# Patient Record
Sex: MALE | Race: WHITE | NOT HISPANIC OR LATINO | Employment: FULL TIME | URBAN - METROPOLITAN AREA
[De-identification: names, ages, dates, MRNs, and addresses within clinical notes are randomized per-mention and may not be internally consistent; named-entity substitution may affect disease eponyms.]

---

## 2021-02-08 ENCOUNTER — HOSPITAL ENCOUNTER (OUTPATIENT)
Facility: HOSPITAL | Age: 56
Discharge: HOME | End: 2021-02-09
Attending: EMERGENCY MEDICINE | Admitting: INTERNAL MEDICINE

## 2021-02-08 DIAGNOSIS — K62.5 RECTAL BLEEDING: Primary | ICD-10-CM

## 2021-02-08 DIAGNOSIS — R55 SYNCOPE, UNSPECIFIED SYNCOPE TYPE: ICD-10-CM

## 2021-02-08 PROBLEM — F10.90 HEAVY ALCOHOL CONSUMPTION: Status: ACTIVE | Noted: 2021-02-08

## 2021-02-08 PROBLEM — I10 ESSENTIAL HYPERTENSION: Status: ACTIVE | Noted: 2021-02-08

## 2021-02-08 PROBLEM — E03.9 HYPOTHYROIDISM (ACQUIRED): Status: ACTIVE | Noted: 2021-02-08

## 2021-02-08 LAB
ABO GROUP BLD: NORMAL
ABO GROUP BLD: NORMAL
ALBUMIN SERPL-MCNC: 3.8 G/DL (ref 3.5–5.2)
ALBUMIN/GLOB SERPL: 1.4 G/DL
ALP SERPL-CCNC: 66 U/L (ref 39–117)
ALT SERPL W P-5'-P-CCNC: 18 U/L (ref 1–41)
ANION GAP SERPL CALCULATED.3IONS-SCNC: 8 MMOL/L (ref 5–15)
AST SERPL-CCNC: 22 U/L (ref 1–40)
BASOPHILS # BLD AUTO: 0.03 10*3/MM3 (ref 0–0.2)
BASOPHILS NFR BLD AUTO: 0.5 % (ref 0–1.5)
BILIRUB SERPL-MCNC: 0.2 MG/DL (ref 0–1.2)
BLD GP AB SCN SERPL QL: NEGATIVE
BUN SERPL-MCNC: 17 MG/DL (ref 6–20)
BUN/CREAT SERPL: 16.2 (ref 7–25)
CALCIUM SPEC-SCNC: 8.8 MG/DL (ref 8.6–10.5)
CHLORIDE SERPL-SCNC: 112 MMOL/L (ref 98–107)
CO2 SERPL-SCNC: 22 MMOL/L (ref 22–29)
CREAT SERPL-MCNC: 1.05 MG/DL (ref 0.76–1.27)
D-LACTATE SERPL-SCNC: 1.1 MMOL/L (ref 0.5–2)
DEPRECATED RDW RBC AUTO: 43.3 FL (ref 37–54)
DEVELOPER EXPIRATION DATE: ABNORMAL
DEVELOPER LOT NUMBER: ABNORMAL
EOSINOPHIL # BLD AUTO: 0.14 10*3/MM3 (ref 0–0.4)
EOSINOPHIL NFR BLD AUTO: 2.4 % (ref 0.3–6.2)
ERYTHROCYTE [DISTWIDTH] IN BLOOD BY AUTOMATED COUNT: 12.8 % (ref 12.3–15.4)
ETHANOL BLD-MCNC: <10 MG/DL (ref 0–10)
EXPIRATION DATE: 923
FECAL OCCULT BLOOD SCREEN, POC: POSITIVE
FLUAV RNA RESP QL NAA+PROBE: NOT DETECTED
FLUBV RNA RESP QL NAA+PROBE: NOT DETECTED
GFR SERPL CREATININE-BSD FRML MDRD: 73 ML/MIN/1.73
GLOBULIN UR ELPH-MCNC: 2.8 GM/DL
GLUCOSE SERPL-MCNC: 111 MG/DL (ref 65–99)
HCT VFR BLD AUTO: 42.6 % (ref 37.5–51)
HCT VFR BLD AUTO: 43.7 % (ref 37.5–51)
HCT VFR BLD AUTO: 47.4 % (ref 37.5–51)
HGB BLD-MCNC: 13.5 G/DL (ref 13–17.7)
HGB BLD-MCNC: 14.2 G/DL (ref 13–17.7)
HGB BLD-MCNC: 15.9 G/DL (ref 13–17.7)
IMM GRANULOCYTES # BLD AUTO: 0.01 10*3/MM3 (ref 0–0.05)
IMM GRANULOCYTES NFR BLD AUTO: 0.2 % (ref 0–0.5)
INR PPP: 1.05 (ref 0.85–1.16)
LYMPHOCYTES # BLD AUTO: 1.81 10*3/MM3 (ref 0.7–3.1)
LYMPHOCYTES NFR BLD AUTO: 30.5 % (ref 19.6–45.3)
Lab: ABNORMAL
MCH RBC QN AUTO: 31 PG (ref 26.6–33)
MCHC RBC AUTO-ENTMCNC: 33.5 G/DL (ref 31.5–35.7)
MCV RBC AUTO: 92.4 FL (ref 79–97)
MONOCYTES # BLD AUTO: 0.6 10*3/MM3 (ref 0.1–0.9)
MONOCYTES NFR BLD AUTO: 10.1 % (ref 5–12)
NEGATIVE CONTROL: NEGATIVE
NEUTROPHILS NFR BLD AUTO: 3.34 10*3/MM3 (ref 1.7–7)
NEUTROPHILS NFR BLD AUTO: 56.3 % (ref 42.7–76)
NRBC BLD AUTO-RTO: 0 /100 WBC (ref 0–0.2)
PLATELET # BLD AUTO: 227 10*3/MM3 (ref 140–450)
PMV BLD AUTO: 9.7 FL (ref 6–12)
POSITIVE CONTROL: POSITIVE
POTASSIUM SERPL-SCNC: 4.6 MMOL/L (ref 3.5–5.2)
PROT SERPL-MCNC: 6.6 G/DL (ref 6–8.5)
PROTHROMBIN TIME: 13.4 SECONDS (ref 11.5–14)
RBC # BLD AUTO: 5.13 10*6/MM3 (ref 4.14–5.8)
RH BLD: POSITIVE
RH BLD: POSITIVE
SARS-COV-2 RNA RESP QL NAA+PROBE: NOT DETECTED
SODIUM SERPL-SCNC: 142 MMOL/L (ref 136–145)
T&S EXPIRATION DATE: NORMAL
WBC # BLD AUTO: 5.93 10*3/MM3 (ref 3.4–10.8)

## 2021-02-08 PROCEDURE — G0378 HOSPITAL OBSERVATION PER HR: HCPCS

## 2021-02-08 PROCEDURE — 82077 ASSAY SPEC XCP UR&BREATH IA: CPT | Performed by: PHYSICIAN ASSISTANT

## 2021-02-08 PROCEDURE — 86901 BLOOD TYPING SEROLOGIC RH(D): CPT

## 2021-02-08 PROCEDURE — 85610 PROTHROMBIN TIME: CPT | Performed by: EMERGENCY MEDICINE

## 2021-02-08 PROCEDURE — 82270 OCCULT BLOOD FECES: CPT | Performed by: EMERGENCY MEDICINE

## 2021-02-08 PROCEDURE — 25010000002 THIAMINE PER 100 MG: Performed by: PHYSICIAN ASSISTANT

## 2021-02-08 PROCEDURE — 99285 EMERGENCY DEPT VISIT HI MDM: CPT

## 2021-02-08 PROCEDURE — 99220 PR INITIAL OBSERVATION CARE/DAY 70 MINUTES: CPT | Performed by: PHYSICIAN ASSISTANT

## 2021-02-08 PROCEDURE — 86901 BLOOD TYPING SEROLOGIC RH(D): CPT | Performed by: EMERGENCY MEDICINE

## 2021-02-08 PROCEDURE — C9803 HOPD COVID-19 SPEC COLLECT: HCPCS

## 2021-02-08 PROCEDURE — 86900 BLOOD TYPING SEROLOGIC ABO: CPT

## 2021-02-08 PROCEDURE — 85014 HEMATOCRIT: CPT | Performed by: HOSPITALIST

## 2021-02-08 PROCEDURE — 93005 ELECTROCARDIOGRAM TRACING: CPT | Performed by: EMERGENCY MEDICINE

## 2021-02-08 PROCEDURE — 83605 ASSAY OF LACTIC ACID: CPT | Performed by: PHYSICIAN ASSISTANT

## 2021-02-08 PROCEDURE — 86850 RBC ANTIBODY SCREEN: CPT | Performed by: EMERGENCY MEDICINE

## 2021-02-08 PROCEDURE — 86900 BLOOD TYPING SEROLOGIC ABO: CPT | Performed by: EMERGENCY MEDICINE

## 2021-02-08 PROCEDURE — 85025 COMPLETE CBC W/AUTO DIFF WBC: CPT | Performed by: EMERGENCY MEDICINE

## 2021-02-08 PROCEDURE — 85018 HEMOGLOBIN: CPT | Performed by: HOSPITALIST

## 2021-02-08 PROCEDURE — 87636 SARSCOV2 & INF A&B AMP PRB: CPT | Performed by: EMERGENCY MEDICINE

## 2021-02-08 PROCEDURE — 80053 COMPREHEN METABOLIC PANEL: CPT | Performed by: EMERGENCY MEDICINE

## 2021-02-08 PROCEDURE — 99203 OFFICE O/P NEW LOW 30 MIN: CPT | Performed by: PHYSICIAN ASSISTANT

## 2021-02-08 RX ORDER — SODIUM CHLORIDE 0.9 % (FLUSH) 0.9 %
10 SYRINGE (ML) INJECTION EVERY 12 HOURS SCHEDULED
Status: DISCONTINUED | OUTPATIENT
Start: 2021-02-08 | End: 2021-02-09 | Stop reason: HOSPADM

## 2021-02-08 RX ORDER — SODIUM CHLORIDE 0.9 % (FLUSH) 0.9 %
10 SYRINGE (ML) INJECTION AS NEEDED
Status: DISCONTINUED | OUTPATIENT
Start: 2021-02-08 | End: 2021-02-09 | Stop reason: HOSPADM

## 2021-02-08 RX ORDER — LEVOTHYROXINE SODIUM 0.03 MG/1
25 TABLET ORAL DAILY
COMMUNITY

## 2021-02-08 RX ORDER — FLUTICASONE PROPIONATE 50 MCG
2 SPRAY, SUSPENSION (ML) NASAL DAILY
COMMUNITY

## 2021-02-08 RX ORDER — LEVOTHYROXINE SODIUM 0.03 MG/1
25 TABLET ORAL
Status: DISCONTINUED | OUTPATIENT
Start: 2021-02-08 | End: 2021-02-09 | Stop reason: HOSPADM

## 2021-02-08 RX ORDER — CHOLECALCIFEROL (VITAMIN D3) 125 MCG
5 CAPSULE ORAL NIGHTLY PRN
Status: DISCONTINUED | OUTPATIENT
Start: 2021-02-08 | End: 2021-02-09 | Stop reason: HOSPADM

## 2021-02-08 RX ORDER — ACETAMINOPHEN 325 MG/1
650 TABLET ORAL EVERY 4 HOURS PRN
Status: DISCONTINUED | OUTPATIENT
Start: 2021-02-08 | End: 2021-02-09 | Stop reason: HOSPADM

## 2021-02-08 RX ORDER — ONDANSETRON 2 MG/ML
4 INJECTION INTRAMUSCULAR; INTRAVENOUS EVERY 6 HOURS PRN
Status: DISCONTINUED | OUTPATIENT
Start: 2021-02-08 | End: 2021-02-09 | Stop reason: HOSPADM

## 2021-02-08 RX ORDER — SODIUM CHLORIDE 9 MG/ML
100 INJECTION, SOLUTION INTRAVENOUS CONTINUOUS
Status: ACTIVE | OUTPATIENT
Start: 2021-02-08 | End: 2021-02-09

## 2021-02-08 RX ADMIN — SODIUM CHLORIDE 1000 ML: 9 INJECTION, SOLUTION INTRAVENOUS at 03:07

## 2021-02-08 RX ADMIN — THIAMINE HYDROCHLORIDE 100 ML/HR: 100 INJECTION, SOLUTION INTRAMUSCULAR; INTRAVENOUS at 08:51

## 2021-02-08 RX ADMIN — SODIUM CHLORIDE 100 ML/HR: 9 INJECTION, SOLUTION INTRAVENOUS at 18:05

## 2021-02-08 RX ADMIN — LEVOTHYROXINE SODIUM 25 MCG: 25 TABLET ORAL at 10:02

## 2021-02-08 NOTE — ED PROVIDER NOTES
Subjective   55-year-old male presents for evaluation of rectal bleeding.  Of note, the patient states that he had a colonoscopy 3 weeks ago that showed some polyps and diverticulosis.  Tonight, he felt the urge to go to the bathroom and got out of bed.  He had a large bowel movement and went back to bed.  He then felt the urge to have another bowel movement and after defecating turn the lights on and noted a significant amount of blood in the toilet bowl.  After third bloody bowel movement with more bright red blood, he came to the emergency department to be evaluated.  He states that he had a fourth bloody bowel movement in the waiting room.  He denies any accompanying abdominal pain.  No rectal pain.  He states that prior to his first bowel movement tonight, he had a syncopal episode at home.  No LOC.  No family history of sudden cardiac death.  He is not anticoagulated.  He denies any issues with rectal bleeding before in the past.          Review of Systems   Gastrointestinal: Positive for blood in stool.   Neurological: Positive for syncope.   All other systems reviewed and are negative.      No past medical history on file.    No Known Allergies    No past surgical history on file.    No family history on file.    Social History     Socioeconomic History   • Marital status:      Spouse name: Not on file   • Number of children: Not on file   • Years of education: Not on file   • Highest education level: Not on file           Objective   Physical Exam  Vitals signs and nursing note reviewed.   Constitutional:       General: He is not in acute distress.     Appearance: Normal appearance. He is well-developed. He is not diaphoretic.      Comments: Nontoxic-appearing male   HENT:      Head: Normocephalic and atraumatic.   Neck:      Musculoskeletal: No muscular tenderness.      Vascular: No JVD.   Cardiovascular:      Rate and Rhythm: Normal rate and regular rhythm.      Heart sounds: Normal heart sounds.  "No murmur. No friction rub. No gallop.    Pulmonary:      Effort: Pulmonary effort is normal. No respiratory distress.      Breath sounds: Normal breath sounds. No wheezing or rales.   Abdominal:      General: Bowel sounds are normal. There is no distension.      Palpations: Abdomen is soft. There is no mass.      Tenderness: There is no abdominal tenderness. There is no guarding.      Comments: No focal abdominal tenderness, no peritoneal signs, no pain out of proportion to exam   Genitourinary:     Comments: Bright red blood noted per rectum, dried blood noted per rectum, no visible or palpable hemorrhoids  Musculoskeletal: Normal range of motion.   Skin:     General: Skin is warm and dry.      Coloration: Skin is not pale.      Findings: No erythema or rash.   Neurological:      General: No focal deficit present.      Mental Status: He is alert and oriented to person, place, and time.      Comments: Normal gait   Psychiatric:         Mood and Affect: Mood normal.         Thought Content: Thought content normal.         Judgment: Judgment normal.         Procedures           ED Course  ED Course as of Feb 08 0638   Mon Feb 08, 2021 0315 55-year-old male presents for evaluation of painless rectal bleeding.  Patient states that he has had for bloody bowel movements tonight with bright red blood.  Each subsequent bowel movement seems to be more more bloody.  He also endorses a syncopal episode.  Of note, the patient states that he had a colonoscopy 3 weeks ago that revealed some \"polyps\" as well as diverticulosis.  On arrival to the ED, the patient is nontoxic-appearing.  He is tachycardic.  On rectal exam, the patient has visible bright red blood and dried blood but no rectal tenderness or hemorrhoids noted.  Nonsurgical abdomen.  Labs remarkable for hemoglobin of 15.    [DD]   0354 Given the patient's overall clinical picture, multiple bloody bowel movements, and syncopal episode, feel that he warrants admission " to the hospital for observation and gastroenterology consult this morning.  He is in agreement.  I discussed his case with Dr. Grey, the patient will be admitted under her care for further evaluation and treatment.  He is aware/agreeable with the plan at this time.    [DD]      ED Course User Index  [DD] Marv Contreras MD                                   Recent Results (from the past 24 hour(s))   Comprehensive Metabolic Panel    Collection Time: 02/08/21  2:44 AM    Specimen: Blood   Result Value Ref Range    Glucose 111 (H) 65 - 99 mg/dL    BUN 17 6 - 20 mg/dL    Creatinine 1.05 0.76 - 1.27 mg/dL    Sodium 142 136 - 145 mmol/L    Potassium 4.6 3.5 - 5.2 mmol/L    Chloride 112 (H) 98 - 107 mmol/L    CO2 22.0 22.0 - 29.0 mmol/L    Calcium 8.8 8.6 - 10.5 mg/dL    Total Protein 6.6 6.0 - 8.5 g/dL    Albumin 3.80 3.50 - 5.20 g/dL    ALT (SGPT) 18 1 - 41 U/L    AST (SGOT) 22 1 - 40 U/L    Alkaline Phosphatase 66 39 - 117 U/L    Total Bilirubin 0.2 0.0 - 1.2 mg/dL    eGFR Non African Amer 73 >60 mL/min/1.73    Globulin 2.8 gm/dL    A/G Ratio 1.4 g/dL    BUN/Creatinine Ratio 16.2 7.0 - 25.0    Anion Gap 8.0 5.0 - 15.0 mmol/L   Protime-INR    Collection Time: 02/08/21  2:44 AM    Specimen: Blood   Result Value Ref Range    Protime 13.4 11.5 - 14.0 Seconds    INR 1.05 0.85 - 1.16   CBC Auto Differential    Collection Time: 02/08/21  2:44 AM    Specimen: Blood   Result Value Ref Range    WBC 5.93 3.40 - 10.80 10*3/mm3    RBC 5.13 4.14 - 5.80 10*6/mm3    Hemoglobin 15.9 13.0 - 17.7 g/dL    Hematocrit 47.4 37.5 - 51.0 %    MCV 92.4 79.0 - 97.0 fL    MCH 31.0 26.6 - 33.0 pg    MCHC 33.5 31.5 - 35.7 g/dL    RDW 12.8 12.3 - 15.4 %    RDW-SD 43.3 37.0 - 54.0 fl    MPV 9.7 6.0 - 12.0 fL    Platelets 227 140 - 450 10*3/mm3    Neutrophil % 56.3 42.7 - 76.0 %    Lymphocyte % 30.5 19.6 - 45.3 %    Monocyte % 10.1 5.0 - 12.0 %    Eosinophil % 2.4 0.3 - 6.2 %    Basophil % 0.5 0.0 - 1.5 %    Immature Grans % 0.2 0.0 - 0.5 %     Neutrophils, Absolute 3.34 1.70 - 7.00 10*3/mm3    Lymphocytes, Absolute 1.81 0.70 - 3.10 10*3/mm3    Monocytes, Absolute 0.60 0.10 - 0.90 10*3/mm3    Eosinophils, Absolute 0.14 0.00 - 0.40 10*3/mm3    Basophils, Absolute 0.03 0.00 - 0.20 10*3/mm3    Immature Grans, Absolute 0.01 0.00 - 0.05 10*3/mm3    nRBC 0.0 0.0 - 0.2 /100 WBC   Ethanol    Collection Time: 02/08/21  2:44 AM    Specimen: Blood   Result Value Ref Range    Ethanol <10 0 - 10 mg/dL   Type & Screen    Collection Time: 02/08/21  2:48 AM    Specimen: Blood   Result Value Ref Range    ABO Type A     RH type Positive     Antibody Screen Negative     T&S Expiration Date 2/11/2021 11:59:59 PM    COVID-19 and FLU A/B PCR - Swab, Nasopharynx    Collection Time: 02/08/21  3:08 AM    Specimen: Nasopharynx; Swab   Result Value Ref Range    COVID19 Not Detected Not Detected - Ref. Range    Influenza A PCR Not Detected Not Detected    Influenza B PCR Not Detected Not Detected   POC Occult Blood Stool    Collection Time: 02/08/21  3:19 AM    Specimen: Per Rectum; Stool   Result Value Ref Range    Fecal Occult Blood Positive (A) Negative    Lot Number [id number]     Expiration Date 923     DEVELOPER LOT NUMBER [id number]     DEVELOPER EXPIRATION DATE 202,210     Positive Control Positive Positive    Negative Control Negative Negative   Lactic Acid, Plasma    Collection Time: 02/08/21  4:13 AM    Specimen: Blood   Result Value Ref Range    Lactate 1.1 0.5 - 2.0 mmol/L     Note: In addition to lab results from this visit, the labs listed above may include labs taken at another facility or during a different encounter within the last 24 hours. Please correlate lab times with ED admission and discharge times for further clarification of the services performed during this visit.    No orders to display     Vitals:    02/08/21 0300 02/08/21 0330 02/08/21 0400 02/08/21 0603   BP: 141/84 127/84 128/89 115/81   BP Location:    Left arm   Patient Position:    Standing   Pulse:  89  85 96   Resp:  18 18 18   Temp:       TempSrc:       SpO2: 94% 98% 96% 96%   Weight:       Height:         Medications   sodium chloride 0.9 % flush 10 mL (has no administration in time range)   thiamine (B-1) 100 mg, folic acid 1 mg in sodium chloride 0.9 % 1,000 mL infusion (has no administration in time range)   sodium chloride 0.9 % bolus 1,000 mL (0 mL Intravenous Stopped 2/8/21 0520)     ECG/EMG Results (last 24 hours)     Procedure Component Value Units Date/Time    ECG 12 Lead [[id number]] Collected: 02/08/21 0405     Updated: 02/08/21 0405        ECG 12 Lead                     MDM    Final diagnoses:   Rectal bleeding   Syncope, unspecified syncope type            Marv Contreras MD  02/08/21 0621

## 2021-02-08 NOTE — H&P
[facility] Medicine Services  Clinical Decision Unit (CDU)  History and Physical    Patient Name: Rufino Ellis  : 1965  MRN: [id number]  Primary Care Physician: Rena Oh MD  Date of admission: 2021  2:45 AM      Subjective   Subjective     Chief Complaint:  Rectal bleeding    HPI:  Rufino Ellis is a 55 y.o. male with a past medical history significant for hypertension and hypothyroidism. He presents today with complaints of acute onset rectal bleeding early this morning around 0100. Patient states he got up in the middle of the night with mild abdominal cramping/bloating. States he got an intense sudden urge to have a bowel movement. Went to the bathroom and passed a large volume of loose stool. Patient got out of bed a second time for what he thought was diarrhea. After the second bowel movement, patient had a near syncopal episode after getting off the commode. States he caught himself before he fell. He then turned on the lights to examine the stool and noticed the commode was full of blood. He passed another large volume of blood before coming to ED for further evaluation. States he passed another large volume of blood in the waiting room after which he was weak and light headed. Currently there are no complaints of fever, cough, congestion, SOB, or chest pain. No abdominal pain, nausea or vomiting. He denies NSAID use, ASA, or anticoagulation. He is not a smoker but does admit to drinking 3 bourbons daily every weekend from Friday thru . States he does not drink during the week and denies history of dependence, withdrawal, or DT's. Of note, patient did have a colonoscopy 3 weeks ago with Dr. Pérez at [facility]. At this time diverticulosis was noted and 3 polyps were removed. Pathology was negative for malignancy per patient. Will admit to observation.      Review of Systems   Constitutional: Negative for chills and fever.   HENT: Negative for congestion  and trouble swallowing.    Eyes: Negative for photophobia and visual disturbance.   Respiratory: Negative for cough and shortness of breath.    Cardiovascular: Negative for chest pain and leg swelling.   Gastrointestinal: Positive for anal bleeding, blood in stool and diarrhea. Negative for nausea and vomiting.   Endocrine: Negative for cold intolerance and heat intolerance.   Genitourinary: Negative for dysuria and flank pain.   Musculoskeletal: Negative for back pain and gait problem.   Skin: Negative for pallor and rash.   Allergic/Immunologic: Negative for immunocompromised state.   Neurological: Positive for dizziness, weakness and light-headedness.   Hematological: Negative for adenopathy.   Psychiatric/Behavioral: Negative for agitation and confusion.        All other systems reviewed and negative    Personal History     Past Medical History:   Diagnosis Date   • Hypertension        Past Surgical History:   Procedure Laterality Date   • COLONOSCOPY     • NOSE SURGERY     • SKIN BIOPSY     • TIBIA FRACTURE SURGERY         Family History: family history is not on file. Otherwise pertinent FHx was reviewed and unremarkable.     Social History:  reports that he has never smoked. He has never used smokeless tobacco. He reports current alcohol use. He reports that he does not use drugs.  Social History     Social History Narrative   • Not on file       Medications:  fluticasone and levothyroxine    No Known Allergies    Objective   Objective     Vital Signs:   Temp:  [96.8 °F (36 °C)] 96.8 °F (36 °C)  Heart Rate:  [] 89  Resp:  [16-18] 18  BP: (127-149)/(84-88) 127/84    Physical Exam   Constitutional: Awake, alert  Eyes: PERRLA, sclerae anicteric, no conjunctival injection  HENT: NCAT, mucous membranes moist  Neck: Supple, no thyromegaly, no lymphadenopathy, trachea midline  Respiratory: Clear to auscultation bilaterally, nonlabored respirations   Cardiovascular: RRR, no murmurs, rubs, or gallops, palpable  pedal pulses bilaterally  Gastrointestinal: Positive bowel sounds, soft, nontender, nondistended  Musculoskeletal: No bilateral ankle edema, no clubbing or cyanosis to extremities  Psychiatric: Appropriate affect, cooperative  Neurologic: Oriented x 3, strength symmetric in all extremities, Cranial Nerves grossly intact to confrontation, speech clear  Skin: No rashes      Results Reviewed:  Vitals stable. Chemistry and hematology favorable. Heme occult positive. EKG without acute changes. COVID PCR negative.    Assessment/Plan   Assessment / Plan     Active Hospital Problems    Diagnosis POA   • **Rectal bleeding [K62.5] Yes     Priority: High   • Essential hypertension [I10] Yes     Priority: Medium   • Heavy alcohol consumption [Z78.9] Yes     Priority: Low   • Hypothyroidism (acquired) [E03.9] Yes     Priority: Low       Plan:  1. Rectal Bleeding:  - lower GI source  - hemodynamically stable. H/H stable  - NPO for now  - consult to GI  - repeat H/H in 6 hours    2. HTN:  - controlled and stable. Not on medications at home  - PRN meds as needed    3. Hypothyroidism:  - continue levothyroxine    4. Heavy alcohol consumption:  - check alcohol level  - banana bag x3  - CIWA assessment. Initiate alcohol protocol as needed    5. Mechanical DVT prophylaxis        CODE STATUS:  Full code  Code Status and Medical Interventions:   Ordered at: 02/08/21 0400     Level Of Support Discussed With:    Patient     Code Status:    CPR     Medical Interventions (Level of Support Prior to Arrest):    Full     Admission Status: OBSERVATION status, however if further evaluation or treatment plans warrant, status may change.  Based upon current information, I predict patient's care encounter to be less than or equal to 2 midnights.        Discharge Blueprint (criteria for discharge):   1. Evaluation of rectal bleeding by GI  2. Resolution of bleeding    Electronically signed by Terry Geronimo PA-C, 02/08/21, 4:05 AM EST.

## 2021-02-08 NOTE — PROGRESS NOTES
Discharge Planning Assessment  [facility]     Patient Name: Rufino Ellis  MRN: [id number]  Today's Date: 2/8/2021    Admit Date: 2/8/2021    Discharge Needs Assessment     Row Name 02/08/21 1001       Living Environment    Lives With  spouse    Name(s) of Who Lives With Patient  brigida ellis - Relation: Spouse - Home: [phone]    Current Living Arrangements  home/apartment/condo    Primary Care Provided by  self    Provides Primary Care For  no one    Family Caregiver if Needed  spouse    Family Caregiver Names  brigida ellis - Relation: Spouse - Home: [phone]    Quality of Family Relationships  helpful;involved;supportive    Able to Return to Prior Arrangements  yes       Resource/Environmental Concerns    Resource/Environmental Concerns  none    Transportation Concerns  car, none       Transition Planning    Patient/Family Anticipates Transition to  home with family    Patient/Family Anticipated Services at Transition  none    Transportation Anticipated  family or friend will provide       Discharge Needs Assessment    Readmission Within the Last 30 Days  no previous admission in last 30 days    Equipment Currently Used at Home  none    Concerns to be Addressed  denies needs/concerns at this time    Anticipated Changes Related to Illness  none    Equipment Needed After Discharge  none        Discharge Plan     Row Name 02/08/21 1002       Plan    Plan  Initial    Plan Comments  CM spoke with patient at bedside. Patient resides in [facility] with his spouse. Patient is independent with ADL's Patient denies any DME. Patient denies any current home health or outpatient services. Patient denies any discharge planning needs. Goal is home with spouse. CM following.    Final Discharge Disposition Code  30 - still a patient        Continued Care and Services - Admitted Since 2/8/2021    Coordination has not been started for this encounter.         Demographic Summary     Row Name 02/08/21 1000       General  Information    Arrived From  home    Referral Source  emergency department    Reason for Consult  discharge planning    Preferred Language  English     Used During This Interaction  no    General Information Comments  PCP: Rena Oh       Contact Information    Contact Information Comments  brigida terrazas - Relation: Spouse - Home: [phone]        Functional Status     Row Name 02/08/21 1001       Functional Status    Usual Activity Tolerance  good    Current Activity Tolerance  good       Functional Status, IADL    Medications  independent    Meal Preparation  independent    Housekeeping  independent    Laundry  independent    Shopping  independent       Mental Status    General Appearance WDL  WDL       Mental Status Summary    Recent Changes in Mental Status/Cognitive Functioning  no changes       Employment/    Employment Status  employed full-time                Samantha Ovalles RN

## 2021-02-08 NOTE — CONSULTS
[facility] Gastroenterology Consult    Referring Provider: Yevgeniy Dockery MD   PCP: Rena Oh MD    Reason for Consultation: Rectal bleeding     Chief complaint: Bloody stools     History of present illness:    Rufino Ellis is a pleasant 55 y.o. male who is admitted with acute rectal bleeding.  He states he was in his normal state of health yesterday and went to bed after watching the super bowl.   He awoke in the night at 0100 with mild abdominal cramping and fecal urgency.  He went to the bathroom and had diarrhea but did not look at the stools.  He subsequently had two more bowel movements that he then noticed were bright red.  He denies any abdominal pain.  He had a screening colonoscopy with Dr. Pérez two weeks ago (1/26/21) with three polyps removed and findings of sigmoid diverticulosis.  Of note, his wife was recently diagnosed last year with Stage IV Colon Cancer.    He does not take any anticoagulation.  He does not regularly use NSAIDs.       Allergies:  Patient has no known allergies.    Scheduled Meds:  levothyroxine, 25 mcg, Oral, Q AM  sodium chloride, 10 mL, Intravenous, Q12H  IV Fluids 1000 mL + additives, 100 mL/hr, Intravenous, Daily       Infusions:  sodium chloride, 100 mL/hr      PRN Meds:  •  acetaminophen  •  melatonin  •  ondansetron  •  [COMPLETED] Insert peripheral IV **AND** sodium chloride  •  sodium chloride    Home Meds:  Medications Prior to Admission   Medication Sig Dispense Refill Last Dose   • fluticasone (FLONASE) 50 MCG/ACT nasal spray 2 sprays into the nostril(s) as directed by provider Daily.      • levothyroxine (SYNTHROID, LEVOTHROID) 25 MCG tablet Take 25 mcg by mouth Daily.          ROS: Review of Systems   Constitutional: Negative.    HENT: Negative.    Eyes: Negative.    Respiratory: Negative.    Cardiovascular: Negative.    Gastrointestinal: Positive for blood in stool. Negative for abdominal pain, nausea and vomiting.   Endocrine: Negative.    Genitourinary: Negative.   "  Musculoskeletal: Negative.    Skin: Negative.    Allergic/Immunologic: Negative.    Neurological: Negative.    Hematological: Negative.    Psychiatric/Behavioral: Negative.        PAST MED HX:  Past Medical History:   Diagnosis Date   • Hypertension        PAST SURG HX:  Past Surgical History:   Procedure Laterality Date   • COLONOSCOPY     • NOSE SURGERY     • SKIN BIOPSY     • TIBIA FRACTURE SURGERY         FAM HX:  History reviewed. No pertinent family history.    SOC HX:  Social History     Socioeconomic History   • Marital status:      Spouse name: Not on file   • Number of children: Not on file   • Years of education: Not on file   • Highest education level: Not on file   Tobacco Use   • Smoking status: Never Smoker   • Smokeless tobacco: Never Used   Substance and Sexual Activity   • Alcohol use: Yes     Comment: pt reports drinks everyweekend , 2-3 drinks bourbon  each day friday, saturday, and sunday    • Drug use: Never   • Sexual activity: Defer       PHYSICAL EXAM  /92 (BP Location: Right arm, Patient Position: Sitting)   Pulse 95   Temp 97.9 °F (36.6 °C) (Oral)   Resp 16   Ht 182.9 cm (72\")   Wt 92.1 kg (203 lb)   SpO2 94%   BMI 27.53 kg/m²   Wt Readings from Last 3 Encounters:   02/08/21 92.1 kg (203 lb)   ,body mass index is 27.53 kg/m².  Physical Exam  Constitutional:       General: He is not in acute distress.  HENT:      Head: Normocephalic and atraumatic.      Mouth/Throat:      Mouth: Mucous membranes are moist.   Eyes:      General: No scleral icterus.  Cardiovascular:      Rate and Rhythm: Normal rate and regular rhythm.   Pulmonary:      Effort: Pulmonary effort is normal.      Breath sounds: Normal breath sounds.   Abdominal:      General: Bowel sounds are normal. There is no distension.      Palpations: Abdomen is soft.      Tenderness: There is no abdominal tenderness.   Musculoskeletal:      Right lower leg: No edema.      Left lower leg: No edema.   Skin:     " General: Skin is warm and dry.   Neurological:      General: No focal deficit present.      Mental Status: He is alert and oriented to person, place, and time.   Psychiatric:         Behavior: Behavior normal.         Thought Content: Thought content normal.       Results Review:   I reviewed the patient's new clinical results.    Lab Results   Component Value Date    WBC 5.93 02/08/2021    HGB 14.2 02/08/2021    HGB 15.9 02/08/2021    HCT 43.7 02/08/2021    MCV 92.4 02/08/2021     02/08/2021       Lab Results   Component Value Date    INR 1.05 02/08/2021       Lab Results   Component Value Date    GLUCOSE 111 (H) 02/08/2021    BUN 17 02/08/2021    CREATININE 1.05 02/08/2021    EGFRIFNONA 73 02/08/2021    BCR 16.2 02/08/2021     02/08/2021    K 4.6 02/08/2021    CO2 22.0 02/08/2021    CALCIUM 8.8 02/08/2021    ALBUMIN 3.80 02/08/2021    ALKPHOS 66 02/08/2021    BILITOT 0.2 02/08/2021    ALT 18 02/08/2021    AST 22 02/08/2021       ASSESSMENTS/PLANS    1. Rectal bleeding  2. History of colon polyps s/p recent colonoscopy with polypectomies on 1/26/21 at [facility]  3. Sigmoid diverticulosis     Pleasant 55 year old male that presents with acute rectal bleeding.   He had a colonoscopy two weeks ago with Dr. Pérez at [facility].  I have reviewed the report that showed five colon polyps removed in descending and transverse colon.  Size ranged from 0.5 to 1.25 cm and removed with hot snare.   Suspect his bleeding is a delayed post polypectomy bleed.   Differential would include diverticular.     His bleeding has stopped since arrival.       >>> Will observe overnight and allow clear liquid diet.  Serial H&H    >>> Defer colonoscopy unless overt bleeding persists.       I discussed the patient's findings and my recommendations with patient    KARLI Mary  02/08/21  12:25 EST

## 2021-02-08 NOTE — PROGRESS NOTES
[facility] Medicine Services  ADMISSION FOLLOW-UP NOTE          Patient admitted after midnight, H&P by my partner performed earlier on today's date reviewed.  Interim findings, labs, and charting also reviewed.        [facility] Problem List has been managed and updated to include any new diagnoses:  Active Hospital Problems    Diagnosis  POA   • **Rectal bleeding [K62.5]  Yes   • Essential hypertension [I10]  Yes   • Heavy alcohol consumption [Z78.9]  Yes   • Hypothyroidism (acquired) [E03.9]  Yes      Resolved Hospital Problems   No resolved problems to display.         ADDITIONAL PLAN:  - detailed assessment and plan from admission reviewed  - Chart reviewed and patient examined.    Rectal bleeding  --type and screen  --serial H&H  --GI consulted for endoscpopy    ETOH abuse  --thiamine    Yevgeniy Dockery MD  02/08/21

## 2021-02-09 ENCOUNTER — ANESTHESIA (OUTPATIENT)
Dept: GASTROENTEROLOGY | Facility: HOSPITAL | Age: 56
End: 2021-02-09

## 2021-02-09 ENCOUNTER — ANESTHESIA EVENT (OUTPATIENT)
Dept: GASTROENTEROLOGY | Facility: HOSPITAL | Age: 56
End: 2021-02-09

## 2021-02-09 VITALS
HEIGHT: 72 IN | TEMPERATURE: 97.7 F | HEART RATE: 82 BPM | OXYGEN SATURATION: 93 % | WEIGHT: 203 LBS | BODY MASS INDEX: 27.5 KG/M2 | RESPIRATION RATE: 16 BRPM | DIASTOLIC BLOOD PRESSURE: 86 MMHG | SYSTOLIC BLOOD PRESSURE: 124 MMHG

## 2021-02-09 PROBLEM — K62.5 RECTAL BLEEDING: Status: RESOLVED | Noted: 2021-02-08 | Resolved: 2021-02-09

## 2021-02-09 LAB
ANION GAP SERPL CALCULATED.3IONS-SCNC: 3 MMOL/L (ref 5–15)
BUN SERPL-MCNC: 10 MG/DL (ref 6–20)
BUN/CREAT SERPL: 10.8 (ref 7–25)
CALCIUM SPEC-SCNC: 8.5 MG/DL (ref 8.6–10.5)
CHLORIDE SERPL-SCNC: 112 MMOL/L (ref 98–107)
CO2 SERPL-SCNC: 25 MMOL/L (ref 22–29)
CREAT SERPL-MCNC: 0.93 MG/DL (ref 0.76–1.27)
FOLATE SERPL-MCNC: 16.3 NG/ML (ref 4.78–24.2)
GFR SERPL CREATININE-BSD FRML MDRD: 84 ML/MIN/1.73
GLUCOSE SERPL-MCNC: 93 MG/DL (ref 65–99)
HCT VFR BLD AUTO: 41.1 % (ref 37.5–51)
HCT VFR BLD AUTO: 46.5 % (ref 37.5–51)
HGB BLD-MCNC: 13.3 G/DL (ref 13–17.7)
HGB BLD-MCNC: 14.8 G/DL (ref 13–17.7)
POTASSIUM SERPL-SCNC: 4.5 MMOL/L (ref 3.5–5.2)
SODIUM SERPL-SCNC: 140 MMOL/L (ref 136–145)
VIT B12 BLD-MCNC: 370 PG/ML (ref 211–946)

## 2021-02-09 PROCEDURE — 82746 ASSAY OF FOLIC ACID SERUM: CPT | Performed by: HOSPITALIST

## 2021-02-09 PROCEDURE — G0378 HOSPITAL OBSERVATION PER HR: HCPCS

## 2021-02-09 PROCEDURE — 85014 HEMATOCRIT: CPT | Performed by: HOSPITALIST

## 2021-02-09 PROCEDURE — 25010000002 PROPOFOL 10 MG/ML EMULSION: Performed by: NURSE ANESTHETIST, CERTIFIED REGISTERED

## 2021-02-09 PROCEDURE — 25010000002 THIAMINE PER 100 MG: Performed by: PHYSICIAN ASSISTANT

## 2021-02-09 PROCEDURE — 45385 COLONOSCOPY W/LESION REMOVAL: CPT | Performed by: INTERNAL MEDICINE

## 2021-02-09 PROCEDURE — 99217 PR OBSERVATION CARE DISCHARGE MANAGEMENT: CPT | Performed by: INTERNAL MEDICINE

## 2021-02-09 PROCEDURE — 88305 TISSUE EXAM BY PATHOLOGIST: CPT | Performed by: INTERNAL MEDICINE

## 2021-02-09 PROCEDURE — 85018 HEMOGLOBIN: CPT | Performed by: HOSPITALIST

## 2021-02-09 PROCEDURE — 80048 BASIC METABOLIC PNL TOTAL CA: CPT | Performed by: HOSPITALIST

## 2021-02-09 PROCEDURE — 82607 VITAMIN B-12: CPT | Performed by: HOSPITALIST

## 2021-02-09 DEVICE — DEV CLIP ENDO RESOLUTION360 CONTRL ROT 235CM: Type: IMPLANTABLE DEVICE | Site: TRANSVERSE COLON | Status: FUNCTIONAL

## 2021-02-09 RX ORDER — SODIUM CHLORIDE, SODIUM LACTATE, POTASSIUM CHLORIDE, CALCIUM CHLORIDE 600; 310; 30; 20 MG/100ML; MG/100ML; MG/100ML; MG/100ML
20 INJECTION, SOLUTION INTRAVENOUS CONTINUOUS
Status: DISCONTINUED | OUTPATIENT
Start: 2021-02-09 | End: 2021-02-09 | Stop reason: HOSPADM

## 2021-02-09 RX ORDER — FAMOTIDINE 10 MG/ML
20 INJECTION, SOLUTION INTRAVENOUS EVERY 12 HOURS SCHEDULED
Status: DISCONTINUED | OUTPATIENT
Start: 2021-02-09 | End: 2021-02-09 | Stop reason: HOSPADM

## 2021-02-09 RX ORDER — LIDOCAINE HYDROCHLORIDE 10 MG/ML
INJECTION, SOLUTION EPIDURAL; INFILTRATION; INTRACAUDAL; PERINEURAL AS NEEDED
Status: DISCONTINUED | OUTPATIENT
Start: 2021-02-09 | End: 2021-02-09 | Stop reason: SURG

## 2021-02-09 RX ORDER — PEG-3350, SODIUM SULFATE, SODIUM CHLORIDE, POTASSIUM CHLORIDE, SODIUM ASCORBATE AND ASCORBIC ACID 7.5-2.691G
2000 KIT ORAL
Status: COMPLETED | OUTPATIENT
Start: 2021-02-09 | End: 2021-02-09

## 2021-02-09 RX ORDER — PROPOFOL 10 MG/ML
VIAL (ML) INTRAVENOUS AS NEEDED
Status: DISCONTINUED | OUTPATIENT
Start: 2021-02-09 | End: 2021-02-09 | Stop reason: SURG

## 2021-02-09 RX ORDER — FAMOTIDINE 20 MG/1
20 TABLET, FILM COATED ORAL EVERY 12 HOURS SCHEDULED
Status: DISCONTINUED | OUTPATIENT
Start: 2021-02-09 | End: 2021-02-09 | Stop reason: HOSPADM

## 2021-02-09 RX ADMIN — SODIUM CHLORIDE, PRESERVATIVE FREE 10 ML: 5 INJECTION INTRAVENOUS at 08:52

## 2021-02-09 RX ADMIN — SODIUM CHLORIDE, POTASSIUM CHLORIDE, SODIUM LACTATE AND CALCIUM CHLORIDE 20 ML/HR: 600; 310; 30; 20 INJECTION, SOLUTION INTRAVENOUS at 12:31

## 2021-02-09 RX ADMIN — LIDOCAINE HYDROCHLORIDE 100 MG: 10 INJECTION, SOLUTION EPIDURAL; INFILTRATION; INTRACAUDAL; PERINEURAL at 14:22

## 2021-02-09 RX ADMIN — PROPOFOL 50 MG: 10 INJECTION, EMULSION INTRAVENOUS at 14:27

## 2021-02-09 RX ADMIN — PROPOFOL 120 MG: 10 INJECTION, EMULSION INTRAVENOUS at 14:22

## 2021-02-09 RX ADMIN — LEVOTHYROXINE SODIUM 25 MCG: 25 TABLET ORAL at 05:28

## 2021-02-09 RX ADMIN — POLYETHYLENE GLYCOL 3350, SODIUM SULFATE, SODIUM CHLORIDE, POTASSIUM CHLORIDE, ASCORBIC ACID, SODIUM ASCORBATE 2000 ML: KIT at 08:43

## 2021-02-09 RX ADMIN — PROPOFOL 250 MCG/KG/MIN: 10 INJECTION, EMULSION INTRAVENOUS at 14:22

## 2021-02-09 RX ADMIN — THIAMINE HYDROCHLORIDE 100 ML/HR: 100 INJECTION, SOLUTION INTRAMUSCULAR; INTRAVENOUS at 08:52

## 2021-02-09 NOTE — DISCHARGE INSTRUCTIONS
Colonoscopy, Adult, Care After  This sheet gives you information about how to care for yourself after your procedure. Your health care provider may also give you more specific instructions. If you have problems or questions, contact your health care provider.  What can I expect after the procedure?  After the procedure, it is common to have:  · A small amount of blood in your stool for 24 hours after the procedure.  · Some gas.  · Mild cramping or bloating of your abdomen.  Follow these instructions at home:  Eating and drinking    · Drink enough fluid to keep your urine pale yellow.  · Follow instructions from your health care provider about eating or drinking restrictions.  · Resume your normal diet as instructed by your health care provider. Avoid heavy or fried foods that are hard to digest.  Activity  · Rest as told by your health care provider.  · Avoid sitting for a long time without moving. Get up to take short walks every 1-2 hours. This is important to improve blood flow and breathing. Ask for help if you feel weak or unsteady.  · Return to your normal activities as told by your health care provider. Ask your health care provider what activities are safe for you.  Managing cramping and bloating    · Try walking around when you have cramps or feel bloated.  · Apply heat to your abdomen as told by your health care provider. Use the heat source that your health care provider recommends, such as a moist heat pack or a heating pad.  ? Place a towel between your skin and the heat source.  ? Leave the heat on for 20-30 minutes.  ? Remove the heat if your skin turns bright red. This is especially important if you are unable to feel pain, heat, or cold. You may have a greater risk of getting burned.  General instructions  · For the first 24 hours after the procedure:  ? Do not drive or use machinery.  ? Do not sign important documents.  ? Do not drink alcohol.  ? Do your regular daily activities at a slower pace  than normal.  ? Eat soft foods that are easy to digest.  · Take over-the-counter and prescription medicines only as told by your health care provider.  · Keep all follow-up visits as told by your health care provider. This is important.  Contact a health care provider if:  · You have blood in your stool 2-3 days after the procedure.  Get help right away if you have:  · More than a small spotting of blood in your stool.  · Large blood clots in your stool.  · Swelling of your abdomen.  · Nausea or vomiting.  · A fever.  · Increasing pain in your abdomen that is not relieved with medicine.  Summary  · After the procedure, it is common to have a small amount of blood in your stool. You may also have mild cramping and bloating of your abdomen.  · For the first 24 hours after the procedure, do not drive or use machinery, sign important documents, or drink alcohol.  · Get help right away if you have a lot of blood in your stool, nausea or vomiting, a fever, or increased pain in your abdomen.  This information is not intended to replace advice given to you by your health care provider. Make sure you discuss any questions you have with your health care provider.  Document Revised: 07/13/2020 Document Reviewed: 07/13/2020  [employer] Patient Education © 2020 [employer]  Monitored Anesthesia Care, Care After  These instructions provide you with information about caring for yourself after your procedure. Your health care provider may also give you more specific instructions. Your treatment has been planned according to current medical practices, but problems sometimes occur. Call your health care provider if you have any problems or questions after your procedure.  What can I expect after the procedure?  After your procedure, you may:  · Feel sleepy for several hours.  · Feel clumsy and have poor balance for several hours.  · Feel forgetful about what happened after the procedure.  · Have poor judgment for several  hours.  · Feel nauseous or vomit.  · Have a sore throat if you had a breathing tube during the procedure.  Follow these instructions at home:  For at least 24 hours after the procedure:         · Have a responsible adult stay with you. It is important to have someone help care for you until you are awake and alert.  · Rest as needed.  · Do not:  ? Participate in activities in which you could fall or become injured.  ? Drive.  ? Use heavy machinery.  ? Drink alcohol.  ? Take sleeping pills or medicines that cause drowsiness.  ? Make important decisions or sign legal documents.  ? Take care of children on your own.  Eating and drinking  · Follow the diet that is recommended by your health care provider.  · If you vomit, drink water, juice, or soup when you can drink without vomiting.  · Make sure you have little or no nausea before eating solid foods.  General instructions  · Take over-the-counter and prescription medicines only as told by your health care provider.  · If you have sleep apnea, surgery and certain medicines can increase your risk for breathing problems. Follow instructions from your health care provider about wearing your sleep device:  ? Anytime you are sleeping, including during daytime naps.  ? While taking prescription pain medicines, sleeping medicines, or medicines that make you drowsy.  · If you smoke, do not smoke without supervision.  · Keep all follow-up visits as told by your health care provider. This is important.  Contact a health care provider if:  · You keep feeling nauseous or you keep vomiting.  · You feel light-headed.  · You develop a rash.  · You have a fever.  Get help right away if:  · You have trouble breathing.  Summary  · For several hours after your procedure, you may feel sleepy and have poor judgment.  · Have a responsible adult stay with you for at least 24 hours or until you are awake and alert.  This information is not intended to replace advice given to you by your  health care provider. Make sure you discuss any questions you have with your health care provider.  Document Revised: 03/18/2019 Document Reviewed: 04/09/2017  [employer] Patient Education © 2020 [employer]

## 2021-02-09 NOTE — POST-PROCEDURE NOTE
Colonoscopy-  Bleeding site seen in transverse colon   Clips applied  2 other polyps removed.      REC OK for discharge   Repeat colonoscopy 3 yrs

## 2021-02-09 NOTE — PLAN OF CARE
Problem: Adult Inpatient Plan of Care  Goal: Plan of Care Review  Outcome: Met  Flowsheets (Taken 2/9/2021 1620)  Progress: improving  Plan of Care Reviewed With: patient  Outcome Summary: VSS, denies pain, RA, Colonoscopy completed today. Meets criteria for discharge per GI and hospitalist.  Goal: Patient-Specific Goal (Individualized)  Outcome: Met  Goal: Absence of Hospital-Acquired Illness or Injury  Outcome: Met  Intervention: Identify and Manage Fall Risk  Recent Flowsheet Documentation  Taken 2/9/2021 1400 by Liza Chatterjee RN  Safety Promotion/Fall Prevention: patient off unit  Taken 2/9/2021 1200 by Liza Chatterjee RN  Safety Promotion/Fall Prevention:   activity supervised   fall prevention program maintained   nonskid shoes/slippers when out of bed   safety round/check completed  Taken 2/9/2021 1000 by Liza Chatterjee RN  Safety Promotion/Fall Prevention:   activity supervised   fall prevention program maintained   nonskid shoes/slippers when out of bed   safety round/check completed  Taken 2/9/2021 0800 by Liza Chatterjee RN  Safety Promotion/Fall Prevention:   activity supervised   fall prevention program maintained   nonskid shoes/slippers when out of bed   safety round/check completed  Intervention: Prevent Skin Injury  Recent Flowsheet Documentation  Taken 2/9/2021 1200 by Liza Chatterjee RN  Body Position: sitting up in bed  Taken 2/9/2021 1000 by Liza Chatterjee RN  Body Position: sitting up in bed  Taken 2/9/2021 0800 by Liza Chatterjee RN  Body Position: sitting up in bed  Intervention: Prevent and Manage VTE (venous thromboembolism) Risk  Recent Flowsheet Documentation  Taken 2/9/2021 0800 by Liza Chatterjee RN  VTE Prevention/Management:   bilateral   dorsiflexion/plantar flexion performed   sequential compression devices on  Goal: Optimal Comfort and Wellbeing  Outcome: Met  Intervention: Provide Person-Centered Care  Recent Flowsheet Documentation  Taken 2/9/2021 0800 by Liza Chatterjee RN  Trust  Relationship/Rapport:   care explained   thoughts/feelings acknowledged  Goal: Readiness for Transition of Care  Outcome: Met   Goal Outcome Evaluation:  Plan of Care Reviewed With: patient  Progress: improving  Outcome Summary: VSS, denies pain, RA, Colonoscopy completed today. Meets criteria for discharge per GI and hospitalist.

## 2021-02-09 NOTE — PLAN OF CARE
Goal Outcome Evaluation:  Plan of Care Reviewed With: patient  Progress: no change  Outcome Summary: VSS. RA. No stool at this time. No complaints. Pt resting. Will continue to monitor.

## 2021-02-09 NOTE — PROGRESS NOTES
[facility] Medicine Services  PROGRESS NOTE    Patient Name: Rufino Ellis  : 1965  MRN: [id number]    Date of Admission: 2021  Primary Care Physician: Rena Oh MD    Subjective   Subjective     CC: f/u rectal bleed    HPI: Sitting up in bed. No further bleeding since yesterday am @ 0600. No abd cramping.     ROS:  Gen- No fevers, chills  CV- No chest pain, palpitations  Resp- No cough, dyspnea  GI- No N/V/D, abd pain    Objective   Objective     Vital Signs:   Temp:  [97.3 °F (36.3 °C)-98.8 °F (37.1 °C)] 97.3 °F (36.3 °C)  Heart Rate:  [75-95] 81  Resp:  [18] 18  BP: (120-138)/(87-93) 131/87        Physical Exam:  Constitutional: No acute distress, awake, alert  HENT: NCAT, mucous membranes moist  Respiratory: Clear to auscultation bilaterally, respiratory effort normal   Cardiovascular: RRR, no murmurs, rubs, or gallops  Gastrointestinal: Positive bowel sounds, soft, nontender, nondistended  Musculoskeletal: No bilateral ankle edema  Psychiatric: Appropriate affect, cooperative  Neurologic: Oriented x 3, strength symmetric in all extremities, Cranial Nerves grossly intact to confrontation, speech clear  Skin: No rashes    Results Reviewed:  Results from last 7 days   Lab Units 21  1016 21  0219 21  1800  21  0244   WBC 10*3/mm3  --   --   --   --  5.93   HEMOGLOBIN g/dL 14.8 13.3 13.5   < > 15.9   HEMATOCRIT % 46.5 41.1 42.6   < > 47.4   PLATELETS 10*3/mm3  --   --   --   --  227   INR   --   --   --   --  1.05    < > = values in this interval not displayed.     Results from last 7 days   Lab Units 21  0516 21  0244   SODIUM mmol/L 140 142   POTASSIUM mmol/L 4.5 4.6   CHLORIDE mmol/L 112* 112*   CO2 mmol/L 25.0 22.0   BUN mg/dL 10 17   CREATININE mg/dL 0.93 1.05   GLUCOSE mg/dL 93 111*   CALCIUM mg/dL 8.5* 8.8   ALT (SGPT) U/L  --  18   AST (SGOT) U/L  --  22     Estimated Creatinine Clearance: 114.2 mL/min (by C-G formula based on  SCr of 0.93 mg/dL).    Microbiology Results Abnormal     Procedure Component Value - Date/Time    COVID-19 and FLU A/B PCR - Swab, Nasopharynx [[id number]]  (Normal) Collected: 02/08/21 0308    Lab Status: Final result Specimen: Swab from Nasopharynx Updated: 02/08/21 0339     COVID19 Not Detected     Influenza A PCR Not Detected     Influenza B PCR Not Detected    Narrative:      Fact sheet for providers: https://www.fda.gov/media/223737/download    Fact sheet for patients: https://www.fda.gov/media/728552/download    Test performed by PCR.        EKG personally reviewed showing NSR. Agree with interpretation.       I have reviewed the medications:  Scheduled Meds:levothyroxine, 25 mcg, Oral, Q AM  sodium chloride, 10 mL, Intravenous, Q12H  IV Fluids 1000 mL + additives, 100 mL/hr, Intravenous, Daily      Continuous Infusions:   PRN Meds:.•  acetaminophen  •  melatonin  •  ondansetron  •  [COMPLETED] Insert peripheral IV **AND** sodium chloride  •  sodium chloride    Assessment/Plan   Assessment & Plan     Active Hospital Problems    Diagnosis  POA   • **Rectal bleeding [K62.5]  Yes   • Essential hypertension [I10]  Yes   • Heavy alcohol consumption [Z78.9]  Yes   • Hypothyroidism (acquired) [E03.9]  Yes      Resolved Hospital Problems   No resolved problems to display.        Brief Hospital Course to date:  Rufino Ellis is a 55 y.o. male male with a past medical history significant for hypertension and hypothyroidism. He presents today with complaints of acute onset rectal bleeding early this morning around 0100. Patient states he got up in the middle of the night with mild abdominal cramping/bloating. States he got an intense sudden urge to have a bowel movement. Went to the bathroom and passed a large volume of loose stool which was quite bloody. This is my first day assessing patient's active medical issues.    Lower GI bleeding  - GI has seen. Planning to repeat c-scope today. Patient had scope 2 weeks ago as  outpatient w/ polypectomy.   - H/H stable and bleeding has stopped.  - Suspect home after scope if unremarkable.     Hypothyroidism:  - continue levothyroxine     Heavy alcohol consumption:  - Continue banana bag for now.  - CIWA assessment. Initiate alcohol protocol as needed    This patient's problems and plans were partially entered by my partner and updated as appropriate by me 02/09/21.    DVT Prophylaxis:  SCDs    Disposition: I expect the patient to be discharged later today vs early tomorrow.    CODE STATUS:   Code Status and Medical Interventions:   Ordered at: 02/08/21 0400     Level Of Support Discussed With:    Patient     Code Status:    CPR     Medical Interventions (Level of Support Prior to Arrest):    Full       Geri Otero II, DO  02/09/21

## 2021-02-09 NOTE — ANESTHESIA POSTPROCEDURE EVALUATION
Patient: Rufino Ellis    Procedure Summary     Date: 02/09/21 Room / Location: [facility] ENDOSCOPY 1 /  [facility]    Anesthesia Start: 1418 Anesthesia Stop: 1508    Procedure: COLONOSCOPY (N/A ) Diagnosis:       Rectal bleeding      (Rectal bleeding [K62.5])    Surgeon: Marco Boone MD Provider: Shantanu Espitia MD    Anesthesia Type: general ASA Status: 3          Anesthesia Type: general    Vitals  Vitals Value Taken Time   /64 02/09/21 1505   Temp 98.5 °F (36.9 °C) 02/09/21 1501   Pulse 93 02/09/21 1508   Resp 18 02/09/21 1501   SpO2 97 % 02/09/21 1508   Vitals shown include unvalidated device data.        Post Anesthesia Care and Evaluation    Patient location during evaluation: PACU  Patient participation: complete - patient participated  Level of consciousness: awake and alert  Pain management: adequate  Airway patency: patent  Anesthetic complications: No anesthetic complications  PONV Status: none  Cardiovascular status: hemodynamically stable and acceptable  Respiratory status: nonlabored ventilation and acceptable  Hydration status: acceptable

## 2021-02-09 NOTE — ANESTHESIA PREPROCEDURE EVALUATION
Anesthesia Evaluation     Patient summary reviewed and Nursing notes reviewed   no history of anesthetic complications:  NPO Solid Status: > 8 hours  NPO Liquid Status: > 2 hours           Airway   Mallampati: II  TM distance: >3 FB  Neck ROM: full  Possible difficult intubation  Dental - normal exam     Pulmonary    (+) decreased breath sounds,   Cardiovascular   Exercise tolerance: good (4-7 METS)    Rhythm: regular  Rate: normal    (+) hypertension well controlled less than 2 medications,       Neuro/Psych  GI/Hepatic/Renal/Endo    (+)  GI bleeding lower active bleeding, thyroid problem hypothyroidism    Musculoskeletal     Abdominal   (-) obese    Abdomen: soft.   Substance History   (+) alcohol use,      OB/GYN          Other                        Anesthesia Plan    ASA 3     general     intravenous induction     Anesthetic plan, all risks, benefits, and alternatives have been provided, discussed and informed consent has been obtained with: patient.    Plan discussed with CRNA.

## 2021-02-10 LAB
CYTO UR: NORMAL
LAB AP CASE REPORT: NORMAL
LAB AP CLINICAL INFORMATION: NORMAL
PATH REPORT.FINAL DX SPEC: NORMAL
PATH REPORT.GROSS SPEC: NORMAL

## 2021-02-10 NOTE — DISCHARGE SUMMARY
[facility] Medicine Services  DISCHARGE SUMMARY    Patient Name: Rufino Ellis  : 1965  MRN: [id number]    Date of Admission: 2021  2:45 AM  Date of Discharge:  2/10/2021  Primary Care Physician: Rena Oh MD    Consults     Date and Time Order Name Status Description    2021 0917 Inpatient Gastroenterology Consult Completed           Hospital Course     Presenting Problem:   Rectal bleeding [K62.5]  Rectal bleeding [K62.5]    Active Hospital Problems    Diagnosis  POA   • Essential hypertension [I10]  Yes   • Heavy alcohol consumption [Z78.9]  Yes   • Hypothyroidism (acquired) [E03.9]  Yes      Resolved Hospital Problems    Diagnosis Date Resolved POA   • **Rectal bleeding [K62.5] 2021 Yes          Hospital Course:  Rufino Ellis is a 55 y.o. male with a past medical history significant for hypertension and hypothyroidism. He presents today with complaints of acute onset rectal bleeding early this morning around 0100. Patient states he got up in the middle of the night with mild abdominal cramping/bloating. States he got an intense sudden urge to have a bowel movement. Went to the bathroom and passed a large volume of loose stool which was quite bloody. He had recently undergone colonoscopy as outpatient. He underwent repeat colonoscopy w/ clips applied and 2 additional polyps removed w/out any further evidence of bleeding. He will repeat his colonoscopy in 3 years.      Day of Discharge     See daily note.    Pertinent  and/or Most Recent Results     LAB RESULTS:      Lab 21  1016 21  0219 21  1800 21  1104 21  0413 21  0244   WBC  --   --   --   --   --  5.93   HEMOGLOBIN 14.8 13.3 13.5 14.2  --  15.9   HEMATOCRIT 46.5 41.1 42.6 43.7  --  47.4   PLATELETS  --   --   --   --   --  227   NEUTROS ABS  --   --   --   --   --  3.34   IMMATURE GRANS (ABS)  --   --   --   --   --  0.01   LYMPHS ABS  --   --   --   --   --  1.81    MONOS ABS  --   --   --   --   --  0.60   EOS ABS  --   --   --   --   --  0.14   MCV  --   --   --   --   --  92.4   LACTATE  --   --   --   --  1.1  --    PROTIME  --   --   --   --   --  13.4         Lab 02/09/21  0516 02/08/21  0244   SODIUM 140 142   POTASSIUM 4.5 4.6   CHLORIDE 112* 112*   CO2 25.0 22.0   ANION GAP 3.0* 8.0   BUN 10 17   CREATININE 0.93 1.05   GLUCOSE 93 111*   CALCIUM 8.5* 8.8         Lab 02/08/21  0244   TOTAL PROTEIN 6.6   ALBUMIN 3.80   GLOBULIN 2.8   ALT (SGPT) 18   AST (SGOT) 22   BILIRUBIN 0.2   ALK PHOS 66         Lab 02/08/21  0244   PROTIME 13.4   INR 1.05             Lab 02/09/21  0516 02/08/21  0900 02/08/21  0248   FOLATE 16.30  --   --    VITAMIN B 12 370  --   --    ABO TYPING  --  A A   RH TYPING  --  Positive Positive   ANTIBODY SCREEN  --   --  Negative         Brief Urine Lab Results     None        Microbiology Results (last 10 days)     Procedure Component Value - Date/Time    COVID-19 and FLU A/B PCR - Swab, Nasopharynx [[id number]]  (Normal) Collected: 02/08/21 0308    Lab Status: Final result Specimen: Swab from Nasopharynx Updated: 02/08/21 0339     COVID19 Not Detected     Influenza A PCR Not Detected     Influenza B PCR Not Detected    Narrative:      Fact sheet for providers: https://www.fda.gov/media/655894/download    Fact sheet for patients: https://www.fda.gov/media/698153/download    Test performed by PCR.          No radiology results for the last 10 days                   Discharge Details        Discharge Medications      Continue These Medications      Instructions Start Date   fluticasone 50 MCG/ACT nasal spray  Commonly known as: FLONASE   2 sprays, Nasal, Daily      levothyroxine 25 MCG tablet  Commonly known as: SYNTHROID, LEVOTHROID   25 mcg, Oral, Daily             No Known Allergies      Discharge Disposition:  Home or Self Care    Diet:  Hospital:No active diet order      Activity:             CODE STATUS:    Code Status and Medical Interventions:    Ordered at: 02/08/21 0400     Level Of Support Discussed With:    Patient     Code Status:    CPR     Medical Interventions (Level of Support Prior to Arrest):    Full       No future appointments.              Geri Otero II, DO  02/10/21      Time Spent on Discharge:  I spent  33 minutes on this discharge activity which included: face-to-face encounter with the patient, reviewing the data in the system, coordination of the care with the nursing staff as well as consultants, documentation, and entering orders.

## 2021-02-18 LAB
QT INTERVAL: 346 MS
QTC INTERVAL: 411 MS

## 2022-07-16 ENCOUNTER — APPOINTMENT (OUTPATIENT)
Dept: CT IMAGING | Facility: HOSPITAL | Age: 57
End: 2022-07-16

## 2022-07-16 ENCOUNTER — HOSPITAL ENCOUNTER (EMERGENCY)
Facility: HOSPITAL | Age: 57
Discharge: HOME | End: 2022-07-16
Attending: EMERGENCY MEDICINE | Admitting: EMERGENCY MEDICINE

## 2022-07-16 VITALS
RESPIRATION RATE: 18 BRPM | HEIGHT: 72 IN | WEIGHT: 202 LBS | HEART RATE: 95 BPM | OXYGEN SATURATION: 94 % | SYSTOLIC BLOOD PRESSURE: 146 MMHG | BODY MASS INDEX: 27.36 KG/M2 | DIASTOLIC BLOOD PRESSURE: 96 MMHG | TEMPERATURE: 98.9 F

## 2022-07-16 DIAGNOSIS — J36 PERITONSILLAR ABSCESS: Primary | ICD-10-CM

## 2022-07-16 LAB
ALBUMIN SERPL-MCNC: 4.2 G/DL (ref 3.5–5.2)
ALBUMIN/GLOB SERPL: 1.3 G/DL
ALP SERPL-CCNC: 79 U/L (ref 39–117)
ALT SERPL W P-5'-P-CCNC: 29 U/L (ref 1–41)
ANION GAP SERPL CALCULATED.3IONS-SCNC: 9 MMOL/L (ref 5–15)
AST SERPL-CCNC: 29 U/L (ref 1–40)
BASOPHILS # BLD AUTO: 0.03 10*3/MM3 (ref 0–0.2)
BASOPHILS NFR BLD AUTO: 0.2 % (ref 0–1.5)
BILIRUB SERPL-MCNC: 0.7 MG/DL (ref 0–1.2)
BUN SERPL-MCNC: 18 MG/DL (ref 6–20)
BUN/CREAT SERPL: 14.6 (ref 7–25)
CALCIUM SPEC-SCNC: 9.2 MG/DL (ref 8.6–10.5)
CHLORIDE SERPL-SCNC: 102 MMOL/L (ref 98–107)
CO2 SERPL-SCNC: 27 MMOL/L (ref 22–29)
CREAT SERPL-MCNC: 1.23 MG/DL (ref 0.76–1.27)
D-LACTATE SERPL-SCNC: 1 MMOL/L (ref 0.5–2)
DEPRECATED RDW RBC AUTO: 46.8 FL (ref 37–54)
EGFRCR SERPLBLD CKD-EPI 2021: 67.6 ML/MIN/1.73
EOSINOPHIL # BLD AUTO: 0.03 10*3/MM3 (ref 0–0.4)
EOSINOPHIL NFR BLD AUTO: 0.2 % (ref 0.3–6.2)
ERYTHROCYTE [DISTWIDTH] IN BLOOD BY AUTOMATED COUNT: 13.9 % (ref 12.3–15.4)
GLOBULIN UR ELPH-MCNC: 3.3 GM/DL
GLUCOSE SERPL-MCNC: 91 MG/DL (ref 65–99)
HCT VFR BLD AUTO: 48.4 % (ref 37.5–51)
HGB BLD-MCNC: 16.3 G/DL (ref 13–17.7)
HOLD SPECIMEN: NORMAL
IMM GRANULOCYTES # BLD AUTO: 0.05 10*3/MM3 (ref 0–0.05)
IMM GRANULOCYTES NFR BLD AUTO: 0.4 % (ref 0–0.5)
LYMPHOCYTES # BLD AUTO: 1.02 10*3/MM3 (ref 0.7–3.1)
LYMPHOCYTES NFR BLD AUTO: 8.3 % (ref 19.6–45.3)
MCH RBC QN AUTO: 30.6 PG (ref 26.6–33)
MCHC RBC AUTO-ENTMCNC: 33.7 G/DL (ref 31.5–35.7)
MCV RBC AUTO: 90.8 FL (ref 79–97)
MONOCYTES # BLD AUTO: 1.23 10*3/MM3 (ref 0.1–0.9)
MONOCYTES NFR BLD AUTO: 10 % (ref 5–12)
NEUTROPHILS NFR BLD AUTO: 80.9 % (ref 42.7–76)
NEUTROPHILS NFR BLD AUTO: 9.99 10*3/MM3 (ref 1.7–7)
NRBC BLD AUTO-RTO: 0 /100 WBC (ref 0–0.2)
PLATELET # BLD AUTO: 193 10*3/MM3 (ref 140–450)
PMV BLD AUTO: 9.4 FL (ref 6–12)
POTASSIUM SERPL-SCNC: 4.3 MMOL/L (ref 3.5–5.2)
PROT SERPL-MCNC: 7.5 G/DL (ref 6–8.5)
RBC # BLD AUTO: 5.33 10*6/MM3 (ref 4.14–5.8)
SODIUM SERPL-SCNC: 138 MMOL/L (ref 136–145)
WBC NRBC COR # BLD: 12.35 10*3/MM3 (ref 3.4–10.8)
WHOLE BLOOD HOLD COAG: NORMAL
WHOLE BLOOD HOLD SPECIMEN: NORMAL

## 2022-07-16 PROCEDURE — 25010000002 HYDROMORPHONE 1 MG/ML SOLUTION: Performed by: EMERGENCY MEDICINE

## 2022-07-16 PROCEDURE — 85025 COMPLETE CBC W/AUTO DIFF WBC: CPT | Performed by: EMERGENCY MEDICINE

## 2022-07-16 PROCEDURE — 96375 TX/PRO/DX INJ NEW DRUG ADDON: CPT

## 2022-07-16 PROCEDURE — 25010000002 METHYLPREDNISOLONE PER 125 MG: Performed by: NURSE PRACTITIONER

## 2022-07-16 PROCEDURE — 25010000002 ONDANSETRON PER 1 MG: Performed by: EMERGENCY MEDICINE

## 2022-07-16 PROCEDURE — 80053 COMPREHEN METABOLIC PANEL: CPT | Performed by: EMERGENCY MEDICINE

## 2022-07-16 PROCEDURE — 96374 THER/PROPH/DIAG INJ IV PUSH: CPT

## 2022-07-16 PROCEDURE — 99284 EMERGENCY DEPT VISIT MOD MDM: CPT

## 2022-07-16 PROCEDURE — 87040 BLOOD CULTURE FOR BACTERIA: CPT | Performed by: EMERGENCY MEDICINE

## 2022-07-16 PROCEDURE — 70491 CT SOFT TISSUE NECK W/DYE: CPT

## 2022-07-16 PROCEDURE — 83605 ASSAY OF LACTIC ACID: CPT | Performed by: EMERGENCY MEDICINE

## 2022-07-16 PROCEDURE — 25010000002 IOPAMIDOL 61 % SOLUTION: Performed by: EMERGENCY MEDICINE

## 2022-07-16 RX ORDER — ONDANSETRON 2 MG/ML
4 INJECTION INTRAMUSCULAR; INTRAVENOUS ONCE
Status: COMPLETED | OUTPATIENT
Start: 2022-07-16 | End: 2022-07-16

## 2022-07-16 RX ORDER — SODIUM CHLORIDE 0.9 % (FLUSH) 0.9 %
10 SYRINGE (ML) INJECTION AS NEEDED
Status: DISCONTINUED | OUTPATIENT
Start: 2022-07-16 | End: 2022-07-16 | Stop reason: HOSPADM

## 2022-07-16 RX ORDER — LIDOCAINE HYDROCHLORIDE AND EPINEPHRINE 20; 5 MG/ML; UG/ML
10 INJECTION, SOLUTION EPIDURAL; INFILTRATION; INTRACAUDAL; PERINEURAL ONCE
Status: COMPLETED | OUTPATIENT
Start: 2022-07-16 | End: 2022-07-16

## 2022-07-16 RX ORDER — AMLODIPINE BESYLATE 5 MG/1
5 TABLET ORAL DAILY
COMMUNITY

## 2022-07-16 RX ORDER — HYDROCODONE BITARTRATE AND ACETAMINOPHEN 7.5; 325 MG/1; MG/1
1 TABLET ORAL EVERY 6 HOURS PRN
Qty: 12 TABLET | Refills: 0 | Status: SHIPPED | OUTPATIENT
Start: 2022-07-16

## 2022-07-16 RX ORDER — METHYLPREDNISOLONE SODIUM SUCCINATE 125 MG/2ML
125 INJECTION, POWDER, LYOPHILIZED, FOR SOLUTION INTRAMUSCULAR; INTRAVENOUS ONCE
Status: COMPLETED | OUTPATIENT
Start: 2022-07-16 | End: 2022-07-16

## 2022-07-16 RX ADMIN — IOPAMIDOL 75 ML: 612 INJECTION, SOLUTION INTRAVENOUS at 12:22

## 2022-07-16 RX ADMIN — HYDROMORPHONE HYDROCHLORIDE 1 MG: 1 INJECTION, SOLUTION INTRAMUSCULAR; INTRAVENOUS; SUBCUTANEOUS at 12:28

## 2022-07-16 RX ADMIN — METHYLPREDNISOLONE SODIUM SUCCINATE 125 MG: 125 INJECTION, POWDER, FOR SOLUTION INTRAMUSCULAR; INTRAVENOUS at 12:28

## 2022-07-16 RX ADMIN — SODIUM CHLORIDE 1000 ML: 9 INJECTION, SOLUTION INTRAVENOUS at 12:24

## 2022-07-16 RX ADMIN — LIDOCAINE HYDROCHLORIDE,EPINEPHRINE BITARTRATE 10 ML: 20; .005 INJECTION, SOLUTION EPIDURAL; INFILTRATION; INTRACAUDAL; PERINEURAL at 15:19

## 2022-07-16 RX ADMIN — ONDANSETRON 4 MG: 2 INJECTION INTRAMUSCULAR; INTRAVENOUS at 12:28

## 2022-07-16 NOTE — DISCHARGE INSTRUCTIONS
Home to rest.  Maintain your very best hydration and nutrition.  A soft diet of your choice will be fine.  Continue using ibuprofen 600 mg every 8 hours.  Use the prescription pain medication for that pain that breaks through ibuprofen.  Do not take pain medication on empty stomach.  Continue the clindamycin.  Also, clindamycin is best taken with ample food and fluids.  Follow this with a probiotic of your choice for the next several months.  Follow-up with Dr. Pires on Thursday as planned.  In the meantime, return to the emergency department as needed for worsening symptoms or concerns.  Thank you

## 2022-07-16 NOTE — CONSULTS
"ENT CONSULT    Rufino Ellis  [id number]  1965  Date of Consult 7/16/2022       Referring Provider: Gustavo  Reason for Consultation: Peritonsillar abscess          .     History of present illness: 3 days ago, he developed progressively worsening left-sided sore throat and was seen in our office and placed on oral clindamycin after receiving Decadron and Rocephin injections.  He has had 2 other peritonsillar abscesses in the past 6 weeks.  He has again developed a peritonsillar abscess with severe left-sided pain, trismus, and dysphagia.  He has no airway difficulties.  Over the past 30 minutes, he has felt marked improvement in symptoms and could taste the abscess contents draining down his throat.  He has 2 previous peritonsillar abscesses also spontaneously drained.    Review of Systems  ENT ROS: negative  Pertinent items are noted in HPI  History  Past Medical History:   Diagnosis Date   • Hypertension    ,   Past Surgical History:   Procedure Laterality Date   • COLONOSCOPY     • COLONOSCOPY N/A 2/9/2021    Procedure: COLONOSCOPY;  Surgeon: Marco Boone MD;  Location: [facility] ENDOSCOPY;  Service: Gastroenterology;  Laterality: N/A;   • NOSE SURGERY     • SKIN BIOPSY     • TIBIA FRACTURE SURGERY     , History reviewed. No pertinent family history.,   Social History     Tobacco Use   • Smoking status: Never Smoker   • Smokeless tobacco: Never Used   Substance Use Topics   • Alcohol use: Yes     Comment: pt reports drinks everyweekend , 2-3 drinks bourbon  each day friday, saturday, and sunday    • Drug use: Never   , (Not in a hospital admission)   and Allergies:  Patient has no known allergies.    Objective     Vital Signs   /96 (BP Location: Right arm, Patient Position: Lying)   Pulse 99   Temp 98.9 °F (37.2 °C) (Oral)   Resp 18   Ht 182.9 cm (72\")   Wt 91.6 kg (202 lb)   SpO2 96%   BMI 27.40 kg/m²     Physical Exam:     General Appearance:    Alert, cooperative, in no acute distress   Head:   "  Normocephalic, without obvious abnormality, atraumatic   Ears and nose:   No abnormalities   Throat:  No significant trismus, left tonsil is asymmetrically enlarged but previously noted midline shift of the uvula has almost completely resolved and palpation of his left peritonsillar space reveals no fluctuance.   Neck:   No adenopathy, supple, trachea midline, no thyromegaly, no     carotid bruit, no JVD   Lungs:     Clear to auscultation,respirations regular, even and                   unlabored    Heart:    Regular rhythm and normal rate, normal S1 and S2, no            murmur, no gallop, no rub, no click   Skin:   No bleeding, bruising or rash   Neurologic:   Cranial nerves 2 - 12 grossly intact, sensation intact          Procedure: After local anesthesia with 1% lidocaine with epinephrine into the left peritonsillar space, needle aspiration of the peritonsillar space was performed with an 18-gauge needle and 10 mL syringe.  No residual abscess was identified.  Patient tolerated the procedure well.       Results Review:   I reviewed the patient's new clinical results.  I reviewed the patient's new imaging results and agree with the interpretation.  Results from last 7 days   Lab Units 07/16/22  1036   WBC 10*3/mm3 12.35*   HEMOGLOBIN g/dL 16.3   HEMATOCRIT % 48.4   PLATELETS 10*3/mm3 193     Results from last 7 days   Lab Units 07/16/22  1036   SODIUM mmol/L 138   POTASSIUM mmol/L 4.3   CHLORIDE mmol/L 102   CO2 mmol/L 27.0   BUN mg/dL 18   CREATININE mg/dL 1.23   CALCIUM mg/dL 9.2   BILIRUBIN mg/dL 0.7   ALK PHOS U/L 79   ALT (SGPT) U/L 29   AST (SGOT) U/L 29   GLUCOSE mg/dL 91       Imaging:  Imaging Results (Last 72 Hours)     Procedure Component Value Units Date/Time    CT Soft Tissue Neck With Contrast [[id number]] Collected: 07/16/22 1244     Updated: 07/16/22 1254    Narrative:      DATE OF EXAM: 7/16/2022 12:11 PM     PROCEDURE: CT SOFT TISSUE NECK W CONTRAST-     INDICATIONS: exam clinically consistent w  peritonsillar abscess on left     COMPARISON: No comparisons available.     TECHNIQUE: After the intravenous administration of 75 mL of Isovue 300,  contiguous axial images were obtained through the neck. Coronal and  sagittal reconstructions were performed. Automated exposure control and  iterative reconstruction methods were used.      The radiation dose reduction device was turned on for each scan per the  ALARA (As Low as Reasonably Achievable) protocol.     FINDINGS:   As suspected clinically, there is a large left peritonsillar abscess,  measuring approximately 3.6 x 1.8 cm in maximal diameter, image 43  series 2. There is significant associated airway narrowing, and diffuse  moderate left-sided soft tissue swelling down to the level of the  glottis, from the level of the abscess down to image #66. No other  discrete abscess is identified.     No right-sided inflammatory changes are appreciated. No retropharyngeal  component of abscess/inflammation is appreciated.     Elsewhere, the included base of the brain and orbits appear grossly  normal. Soft tissues of the face appear grossly normal. Parotid glands,  submandibular glands, and thyroid appear grossly normal. There are only  scattered shotty cervical lymph nodes and no extensive adenopathy.  Included images of the upper chest show clear lung apices and no  significant abnormality of the superior mediastinum. There is normal  contrast opacification of the carotid and vertebral arteries and jugular  veins             Impression:         1. Approximately 3.6 x 1.8 cm left peritonsillar abscess. Associated  airway narrowing.  2. Diffuse left-sided soft tissue thickening/inflammation from the level  of the abscess to the left upper margin of the glottis. No additional  abscesses are seen.  3. No evidence of other acute inflammatory process, significant  adenopathy or other acute disease elsewhere.     This report was finalized on 7/16/2022 12:50 PM by Dr. Vuong  MD Jerardo.                   Assessment:  Recurrent peritonsillar abscess with spontaneous drainage.  He is now doing much better with improvement in pain, dysphagia, and trismus.      * No active hospital problems. *      Plan:  He will continue his prescribed course of clindamycin and he has a follow-up visit with scheduled later this week.  He will return to the emergency department if symptoms again worsen.    I discussed the patients findings and my recommendations with patient and family    Umair Mary MD  07/16/22  15:22 EDT    Time: More than 50% of time spent in counseling and coordination of care:  Total face-to-face/floor time 30 min.  Time spent in counseling 15 min. Counseling included the following topics: See assessment and plan

## 2022-07-16 NOTE — ED PROVIDER NOTES
EMERGENCY DEPARTMENT ENCOUNTER    Pt Name: Rufino Ellis  MRN: [id number]  Pt :   1965  Room Number:    Date of encounter:  2022  PCP: Rena Oh MD  ED Provider: SUMIT Oropeza    Historian: patient and wife      HPI:  Chief Complaint: Severe sore throat        Context: Rufino Ellis is a 57 y.o. male who presents to the ED c/o severe sore throat with difficulty swallowing in the last 3 days.  Patient has been followed by ENT physician, Dr. Pires recently for 2 prior events of tonsillitis that have responded to antibiotics.  On Thursday, he was seen by Dr. Pires once again and placed on clindamycin however his pain has drastically increased and now is having difficulty swallowing and subsequent drooling.  He has no stridor.  He does not feel short of breath.    Review of systems is negative for fever chills.  Negative for chest pain or cough or shortness of breath.  Positive for severe sore throat.  No profound weakness, dizziness or syncope.  No neurosensory complaints or focal weakness      PAST MEDICAL HISTORY  Past Medical History:   Diagnosis Date   • Hypertension          PAST SURGICAL HISTORY  Past Surgical History:   Procedure Laterality Date   • COLONOSCOPY     • COLONOSCOPY N/A 2021    Procedure: COLONOSCOPY;  Surgeon: Marco Boone MD;  Location: [facility] ENDOSCOPY;  Service: Gastroenterology;  Laterality: N/A;   • NOSE SURGERY     • SKIN BIOPSY     • TIBIA FRACTURE SURGERY           FAMILY HISTORY  History reviewed. No pertinent family history.      SOCIAL HISTORY  Social History     Socioeconomic History   • Marital status:    Tobacco Use   • Smoking status: Never Smoker   • Smokeless tobacco: Never Used   Substance and Sexual Activity   • Alcohol use: Yes     Comment: pt reports drinks everyweekend , 2-3 drinks bourbon  each day friday, saturday, and     • Drug use: Never   • Sexual activity: Defer         ALLERGIES  Patient has no known  allergies.        REVIEW OF SYSTEMS  Review of Systems   All systems reviewed and negative except for those discussed in HPI.       PHYSICAL EXAM    I have reviewed the triage vital signs and nursing notes.    ED Triage Vitals [07/16/22 1029]   Temp Heart Rate Resp BP SpO2   98.9 °F (37.2 °C) 95 16 147/92 97 %      Temp src Heart Rate Source Patient Position BP Location FiO2 (%)   Oral Monitor Sitting Left arm --       Physical Exam  GENERAL:   Appears very uncomfortable.  His vital signs are normal.  He is leaning back.  No stridor.  He does prefer to spit in a cup due to his discomfort.  HENT: Nares patent.  Profound swelling to the palate appreciated with large peritonsillar abscess causing marked trismus and deviation of the uvula to the right.  Airway is not compromised.  EYES: No scleral icterus.  CV: Regular rhythm, regular rate.  No tachycardia.  No peripheral edema  RESPIRATORY: Normal effort.  No audible wheezes, rales or rhonchi.  ABDOMEN: Soft, nontender  MUSCULOSKELETAL: No deformities.   NEURO: Alert, moves all extremities, follows commands.  SKIN: Warm, dry, no rash visualized.        LAB RESULTS  Recent Results (from the past 24 hour(s))   CBC Auto Differential    Collection Time: 07/16/22 10:36 AM    Specimen: Blood   Result Value Ref Range    WBC 12.35 (H) 3.40 - 10.80 10*3/mm3    RBC 5.33 4.14 - 5.80 10*6/mm3    Hemoglobin 16.3 13.0 - 17.7 g/dL    Hematocrit 48.4 37.5 - 51.0 %    MCV 90.8 79.0 - 97.0 fL    MCH 30.6 26.6 - 33.0 pg    MCHC 33.7 31.5 - 35.7 g/dL    RDW 13.9 12.3 - 15.4 %    RDW-SD 46.8 37.0 - 54.0 fl    MPV 9.4 6.0 - 12.0 fL    Platelets 193 140 - 450 10*3/mm3    Neutrophil % 80.9 (H) 42.7 - 76.0 %    Lymphocyte % 8.3 (L) 19.6 - 45.3 %    Monocyte % 10.0 5.0 - 12.0 %    Eosinophil % 0.2 (L) 0.3 - 6.2 %    Basophil % 0.2 0.0 - 1.5 %    Immature Grans % 0.4 0.0 - 0.5 %    Neutrophils, Absolute 9.99 (H) 1.70 - 7.00 10*3/mm3    Lymphocytes, Absolute 1.02 0.70 - 3.10 10*3/mm3     Monocytes, Absolute 1.23 (H) 0.10 - 0.90 10*3/mm3    Eosinophils, Absolute 0.03 0.00 - 0.40 10*3/mm3    Basophils, Absolute 0.03 0.00 - 0.20 10*3/mm3    Immature Grans, Absolute 0.05 0.00 - 0.05 10*3/mm3    nRBC 0.0 0.0 - 0.2 /100 WBC   Green Top (Gel)    Collection Time: 07/16/22 10:36 AM   Result Value Ref Range    Extra Tube Hold for add-ons.    Lavender Top    Collection Time: 07/16/22 10:36 AM   Result Value Ref Range    Extra Tube hold for add-on    Gold Top - SST    Collection Time: 07/16/22 10:36 AM   Result Value Ref Range    Extra Tube Hold for add-ons.    Gray Top    Collection Time: 07/16/22 10:36 AM   Result Value Ref Range    Extra Tube Hold for add-ons.    Light Blue Top    Collection Time: 07/16/22 10:36 AM   Result Value Ref Range    Extra Tube Hold for add-ons.    Comprehensive Metabolic Panel    Collection Time: 07/16/22 10:36 AM    Specimen: Blood   Result Value Ref Range    Glucose 91 65 - 99 mg/dL    BUN 18 6 - 20 mg/dL    Creatinine 1.23 0.76 - 1.27 mg/dL    Sodium 138 136 - 145 mmol/L    Potassium 4.3 3.5 - 5.2 mmol/L    Chloride 102 98 - 107 mmol/L    CO2 27.0 22.0 - 29.0 mmol/L    Calcium 9.2 8.6 - 10.5 mg/dL    Total Protein 7.5 6.0 - 8.5 g/dL    Albumin 4.20 3.50 - 5.20 g/dL    ALT (SGPT) 29 1 - 41 U/L    AST (SGOT) 29 1 - 40 U/L    Alkaline Phosphatase 79 39 - 117 U/L    Total Bilirubin 0.7 0.0 - 1.2 mg/dL    Globulin 3.3 gm/dL    A/G Ratio 1.3 g/dL    BUN/Creatinine Ratio 14.6 7.0 - 25.0    Anion Gap 9.0 5.0 - 15.0 mmol/L    eGFR 67.6 >60.0 mL/min/1.73   Lactic Acid, Plasma    Collection Time: 07/16/22 10:36 AM    Specimen: Blood   Result Value Ref Range    Lactate 1.0 0.5 - 2.0 mmol/L       If labs were ordered, I independently reviewed the results.        RADIOLOGY  CT Soft Tissue Neck With Contrast    Result Date: 7/16/2022  DATE OF EXAM: 7/16/2022 12:11 PM  PROCEDURE: CT SOFT TISSUE NECK W CONTRAST-  INDICATIONS: exam clinically consistent w peritonsillar abscess on left   COMPARISON: No comparisons available.  TECHNIQUE: After the intravenous administration of 75 mL of Isovue 300, contiguous axial images were obtained through the neck. Coronal and sagittal reconstructions were performed. Automated exposure control and iterative reconstruction methods were used.  The radiation dose reduction device was turned on for each scan per the ALARA (As Low as Reasonably Achievable) protocol.  FINDINGS: As suspected clinically, there is a large left peritonsillar abscess, measuring approximately 3.6 x 1.8 cm in maximal diameter, image 43 series 2. There is significant associated airway narrowing, and diffuse moderate left-sided soft tissue swelling down to the level of the glottis, from the level of the abscess down to image #66. No other discrete abscess is identified.  No right-sided inflammatory changes are appreciated. No retropharyngeal component of abscess/inflammation is appreciated.  Elsewhere, the included base of the brain and orbits appear grossly normal. Soft tissues of the face appear grossly normal. Parotid glands, submandibular glands, and thyroid appear grossly normal. There are only scattered shotty cervical lymph nodes and no extensive adenopathy. Included images of the upper chest show clear lung apices and no significant abnormality of the superior mediastinum. There is normal contrast opacification of the carotid and vertebral arteries and jugular veins         1. Approximately 3.6 x 1.8 cm left peritonsillar abscess. Associated airway narrowing. 2. Diffuse left-sided soft tissue thickening/inflammation from the level of the abscess to the left upper margin of the glottis. No additional abscesses are seen. 3. No evidence of other acute inflammatory process, significant adenopathy or other acute disease elsewhere.  This report was finalized on 7/16/2022 12:50 PM by Dr. Yevgeniy Kurtz MD.          PROCEDURES    Procedures    No orders to display       MEDICATIONS GIVEN IN  ER    Medications   sodium chloride 0.9 % flush 10 mL (has no administration in time range)   sodium chloride 0.9 % bolus 1,000 mL (0 mL Intravenous Stopped 7/16/22 1418)   HYDROmorphone (DILAUDID) injection 1 mg (1 mg Intravenous Given 7/16/22 1228)   ondansetron (ZOFRAN) injection 4 mg (4 mg Intravenous Given 7/16/22 1228)   methylPREDNISolone sodium succinate (SOLU-Medrol) injection 125 mg (125 mg Intravenous Given 7/16/22 1228)   iopamidol (ISOVUE-300) 61 % injection 100 mL (75 mL Intravenous Given 7/16/22 1222)   lidocaine-EPINEPHrine (XYLOCAINE W/EPI) 2 %-1:679805 injection 10 mL (10 mL Injection Given 7/16/22 1519)         ED Course as of 07/16/22 1611   Sat Jul 16, 2022   1321 I have paged ENT [MS]   1340 Dr. Mary will be at the patient's bedside shortly.  Orders been received.  Patient and his wife understand and concur with this plan.  He has met for comfortable.  All of his questions were answered to their satisfaction.  His vital signs have been stable throughout his ED course.  He is still relaxed and without stridor. [MS]   1500 Patient has spontaneous eruption of the peritonsillar abscess.  Dr. Mary was a witness to this and further aspirated the site.  Patient feels much better and is able to successfully speak.  No airway compromise.  He understands to continue the clindamycin.  Pain meds sent to the pharmacy.  We discussed parameters for concern that would warrant return to the emergency department.  In the meantime, he plans to follow-up with Dr. Pires on Thursday. [MS]      ED Course User Index  [MS] Carmen Rojas, SUMIT           AS OF 16:11 EDT VITALS:    BP - 146/96  HR - 95  TEMP - 98.9 °F (37.2 °C) (Oral)  O2 SATS - 94%      DEVYN reviewed by Destinee Chen MD           DIAGNOSIS  Final diagnoses:   Peritonsillar abscess         DISPOSITION    DISCHARGE    Patient discharged in stable condition.    Reviewed implications of results, diagnosis, meds, responsibility to  follow up, warning signs and symptoms of possible worsening, potential complications and reasons to return to ER.    Patient/Family voiced understanding of above instructions.    Discussed plan for discharge, as there is no emergent indication for admission.  Pt/family is agreeable and understands need for follow up and possible repeat testing.  Pt/family is aware that discharge does not mean that nothing is wrong but that it indicates no emergency is currently present that requires admission and they must continue care with follow-up as given below or with a physician of their choice.     FOLLOW-UP  Marv Pires MD  [address]  [address]  [place] [id number] [phone]               Medication List      New Prescriptions    HYDROcodone-acetaminophen 7.5-325 MG per tablet  Commonly known as: NORCO  Take 1 tablet by mouth Every 6 (Six) Hours As Needed for Moderate Pain .           Where to Get Your Medications      These medications were sent to [id number] - [place], [place] - [address] AT [facility] - [phone] [facility] [phone]   [address] [address] [address]    Phone: [phone]   · HYDROcodone-acetaminophen 7.5-325 MG per tablet                  Carmen Rojas, SUMIT  07/16/22 1611

## 2022-07-21 LAB
BACTERIA SPEC AEROBE CULT: NORMAL
BACTERIA SPEC AEROBE CULT: NORMAL

## 2022-11-28 ENCOUNTER — TRANSCRIBE ORDERS (OUTPATIENT)
Dept: LAB | Facility: HOSPITAL | Age: 57
End: 2022-11-28

## 2022-11-28 ENCOUNTER — LAB (OUTPATIENT)
Dept: LAB | Facility: HOSPITAL | Age: 57
End: 2022-11-28

## 2022-11-28 DIAGNOSIS — Z01.812 PRE-OPERATIVE LABORATORY EXAMINATION: ICD-10-CM

## 2022-11-28 DIAGNOSIS — Z01.812 PRE-OPERATIVE LABORATORY EXAMINATION: Primary | ICD-10-CM

## 2022-11-28 LAB
DEPRECATED RDW RBC AUTO: 40.7 FL (ref 37–54)
ERYTHROCYTE [DISTWIDTH] IN BLOOD BY AUTOMATED COUNT: 12.7 % (ref 12.3–15.4)
HCT VFR BLD AUTO: 45.9 % (ref 37.5–51)
HGB BLD-MCNC: 16.3 G/DL (ref 13–17.7)
MCH RBC QN AUTO: 31.2 PG (ref 26.6–33)
MCHC RBC AUTO-ENTMCNC: 35.5 G/DL (ref 31.5–35.7)
MCV RBC AUTO: 87.8 FL (ref 79–97)
PLATELET # BLD AUTO: 190 10*3/MM3 (ref 140–450)
PMV BLD AUTO: 10.2 FL (ref 6–12)
POTASSIUM SERPL-SCNC: 4.8 MMOL/L (ref 3.5–5.2)
RBC # BLD AUTO: 5.23 10*6/MM3 (ref 4.14–5.8)
WBC NRBC COR # BLD: 8.23 10*3/MM3 (ref 3.4–10.8)

## 2022-11-28 PROCEDURE — 36415 COLL VENOUS BLD VENIPUNCTURE: CPT

## 2022-11-28 PROCEDURE — 85027 COMPLETE CBC AUTOMATED: CPT

## 2022-11-28 PROCEDURE — 84132 ASSAY OF SERUM POTASSIUM: CPT

## (undated) DEVICE — SUCTION CANISTER, 1000CC,SAFELINER: Brand: DEROYAL

## (undated) DEVICE — SINGLE-USE BIOPSY FORCEPS: Brand: RADIAL JAW 4

## (undated) DEVICE — SOL IRR H2O BTL 1000ML STRL

## (undated) DEVICE — THE BITE BLOCK MAXI, LATEX FREE STRAP IS USED TO PROTECT THE ENDOSCOPE INSERTION TUBE FROM BEING BITTEN BY THE PATIENT.

## (undated) DEVICE — SYR LUERLOK 50ML

## (undated) DEVICE — SPNG VERSALON 4X4 4PLY NONSTRL LF BG/200

## (undated) DEVICE — LUBE GEL ENDOGLIDE 1.1OZ

## (undated) DEVICE — CONTN GRAD MEAS TRIANG 32OZ BLK

## (undated) DEVICE — SINGLE-USE POLYPECTOMY SNARE: Brand: SENSATION SHORT THROW

## (undated) DEVICE — THE SINGLE USE ETRAP – POLYP TRAP IS USED FOR SUCTION RETRIEVAL OF ENDOSCOPICALLY REMOVED POLYPS.: Brand: ETRAP

## (undated) DEVICE — HYBRID CO2 TUBING/CAP SET FOR OLYMPUS® SCOPES & CO2 SOURCE: Brand: ERBE

## (undated) DEVICE — KT ORCA ORCAPOD DISP STRL

## (undated) DEVICE — TUBING, SUCTION, 1/4" X 10', STRAIGHT: Brand: MEDLINE

## (undated) DEVICE — INTRO ACCSR BLNT TP